# Patient Record
Sex: FEMALE | Race: WHITE | NOT HISPANIC OR LATINO | URBAN - NONMETROPOLITAN AREA
[De-identification: names, ages, dates, MRNs, and addresses within clinical notes are randomized per-mention and may not be internally consistent; named-entity substitution may affect disease eponyms.]

---

## 2019-11-20 ENCOUNTER — LAB REQUISITION (OUTPATIENT)
Dept: LAB | Facility: OTHER | Age: 34
End: 2019-11-20

## 2019-11-20 DIAGNOSIS — Z00.00 ROUTINE GENERAL MEDICAL EXAMINATION AT A HEALTH CARE FACILITY: ICD-10-CM

## 2019-11-20 PROCEDURE — UDS COCC - COLLECTION FEE ONLY: Performed by: INTERNAL MEDICINE

## 2020-12-12 PROCEDURE — U0003 INFECTIOUS AGENT DETECTION BY NUCLEIC ACID (DNA OR RNA); SEVERE ACUTE RESPIRATORY SYNDROME CORONAVIRUS 2 (SARS-COV-2) (CORONAVIRUS DISEASE [COVID-19]), AMPLIFIED PROBE TECHNIQUE, MAKING USE OF HIGH THROUGHPUT TECHNOLOGIES AS DESCRIBED BY CMS-2020-01-R: HCPCS | Performed by: NURSE PRACTITIONER

## 2021-02-28 PROCEDURE — 87635 SARS-COV-2 COVID-19 AMP PRB: CPT | Performed by: EMERGENCY MEDICINE

## 2021-04-04 ENCOUNTER — HOSPITAL ENCOUNTER (OUTPATIENT)
Facility: HOSPITAL | Age: 36
Setting detail: OBSERVATION
End: 2021-04-05
Attending: EMERGENCY MEDICINE | Admitting: INTERNAL MEDICINE

## 2021-04-04 DIAGNOSIS — R45.851 SUICIDAL IDEATION: Primary | ICD-10-CM

## 2021-04-04 DIAGNOSIS — F15.10 METHAMPHETAMINE ABUSE (HCC): ICD-10-CM

## 2021-04-04 DIAGNOSIS — N39.0 ACUTE UTI: ICD-10-CM

## 2021-04-04 PROCEDURE — 96365 THER/PROPH/DIAG IV INF INIT: CPT

## 2021-04-04 PROCEDURE — 99285 EMERGENCY DEPT VISIT HI MDM: CPT

## 2021-04-05 ENCOUNTER — APPOINTMENT (OUTPATIENT)
Dept: GENERAL RADIOLOGY | Facility: HOSPITAL | Age: 36
End: 2021-04-05

## 2021-04-05 VITALS
RESPIRATION RATE: 18 BRPM | HEART RATE: 88 BPM | SYSTOLIC BLOOD PRESSURE: 114 MMHG | HEIGHT: 69 IN | WEIGHT: 239.6 LBS | TEMPERATURE: 97.2 F | DIASTOLIC BLOOD PRESSURE: 73 MMHG | OXYGEN SATURATION: 96 % | BODY MASS INDEX: 35.49 KG/M2

## 2021-04-05 PROBLEM — R45.851 SUICIDAL IDEATIONS: Status: ACTIVE | Noted: 2021-04-05

## 2021-04-05 PROBLEM — F15.929 METHAMPHETAMINE INTOXICATION (HCC): Chronic | Status: ACTIVE | Noted: 2021-04-05

## 2021-04-05 LAB
ALBUMIN SERPL-MCNC: 4.1 G/DL (ref 3.5–5.2)
ALBUMIN/GLOB SERPL: 1.2 G/DL
ALP SERPL-CCNC: 61 U/L (ref 39–117)
ALT SERPL W P-5'-P-CCNC: 14 U/L (ref 1–33)
AMPHET+METHAMPHET UR QL: POSITIVE
AMPHETAMINES UR QL: POSITIVE
ANION GAP SERPL CALCULATED.3IONS-SCNC: 12 MMOL/L (ref 5–15)
APAP SERPL-MCNC: <5 MCG/ML (ref 0–30)
AST SERPL-CCNC: 12 U/L (ref 1–32)
BACTERIA UR QL AUTO: ABNORMAL /HPF
BARBITURATES UR QL SCN: NEGATIVE
BASOPHILS # BLD AUTO: 0.06 10*3/MM3 (ref 0–0.2)
BASOPHILS NFR BLD AUTO: 0.4 % (ref 0–1.5)
BENZODIAZ UR QL SCN: NEGATIVE
BILIRUB SERPL-MCNC: 0.4 MG/DL (ref 0–1.2)
BILIRUB UR QL STRIP: ABNORMAL
BUN SERPL-MCNC: 13 MG/DL (ref 6–20)
BUN/CREAT SERPL: 16 (ref 7–25)
BUPRENORPHINE SERPL-MCNC: NEGATIVE NG/ML
CALCIUM SPEC-SCNC: 9.2 MG/DL (ref 8.6–10.5)
CANNABINOIDS SERPL QL: NEGATIVE
CHLORIDE SERPL-SCNC: 105 MMOL/L (ref 98–107)
CLARITY UR: ABNORMAL
CO2 SERPL-SCNC: 20 MMOL/L (ref 22–29)
COCAINE UR QL: NEGATIVE
COLOR UR: ABNORMAL
CREAT SERPL-MCNC: 0.81 MG/DL (ref 0.57–1)
DEPRECATED RDW RBC AUTO: 42.1 FL (ref 37–54)
EOSINOPHIL # BLD AUTO: 0.58 10*3/MM3 (ref 0–0.4)
EOSINOPHIL NFR BLD AUTO: 3.8 % (ref 0.3–6.2)
ERYTHROCYTE [DISTWIDTH] IN BLOOD BY AUTOMATED COUNT: 13.9 % (ref 12.3–15.4)
ETHANOL BLD-MCNC: <10 MG/DL (ref 0–10)
ETHANOL UR QL: <0.01 %
FLUAV RNA RESP QL NAA+PROBE: NOT DETECTED
FLUBV RNA RESP QL NAA+PROBE: NOT DETECTED
GFR SERPL CREATININE-BSD FRML MDRD: 81 ML/MIN/1.73
GLOBULIN UR ELPH-MCNC: 3.3 GM/DL
GLUCOSE SERPL-MCNC: 109 MG/DL (ref 65–99)
GLUCOSE UR STRIP-MCNC: NEGATIVE MG/DL
HCG SERPL QL: NEGATIVE
HCT VFR BLD AUTO: 39.6 % (ref 34–46.6)
HGB BLD-MCNC: 13 G/DL (ref 12–15.9)
HGB UR QL STRIP.AUTO: NEGATIVE
HOLD SPECIMEN: NORMAL
HYALINE CASTS UR QL AUTO: ABNORMAL /LPF
IMM GRANULOCYTES # BLD AUTO: 0.05 10*3/MM3 (ref 0–0.05)
IMM GRANULOCYTES NFR BLD AUTO: 0.3 % (ref 0–0.5)
KETONES UR QL STRIP: ABNORMAL
LEUKOCYTE ESTERASE UR QL STRIP.AUTO: ABNORMAL
LITHIUM SERPL-SCNC: 0.2 MMOL/L (ref 0.6–1.2)
LYMPHOCYTES # BLD AUTO: 3.78 10*3/MM3 (ref 0.7–3.1)
LYMPHOCYTES NFR BLD AUTO: 25 % (ref 19.6–45.3)
MCH RBC QN AUTO: 27.3 PG (ref 26.6–33)
MCHC RBC AUTO-ENTMCNC: 32.8 G/DL (ref 31.5–35.7)
MCV RBC AUTO: 83 FL (ref 79–97)
METHADONE UR QL SCN: NEGATIVE
MONOCYTES # BLD AUTO: 0.8 10*3/MM3 (ref 0.1–0.9)
MONOCYTES NFR BLD AUTO: 5.3 % (ref 5–12)
NEUTROPHILS NFR BLD AUTO: 65.2 % (ref 42.7–76)
NEUTROPHILS NFR BLD AUTO: 9.87 10*3/MM3 (ref 1.7–7)
NITRITE UR QL STRIP: NEGATIVE
NRBC BLD AUTO-RTO: 0 /100 WBC (ref 0–0.2)
OPIATES UR QL: NEGATIVE
OXYCODONE UR QL SCN: NEGATIVE
PCP UR QL SCN: NEGATIVE
PH UR STRIP.AUTO: 6 [PH] (ref 5–9)
PLATELET # BLD AUTO: 281 10*3/MM3 (ref 140–450)
PMV BLD AUTO: 11.9 FL (ref 6–12)
POTASSIUM SERPL-SCNC: 3.3 MMOL/L (ref 3.5–5.2)
PROPOXYPH UR QL: NEGATIVE
PROT SERPL-MCNC: 7.4 G/DL (ref 6–8.5)
PROT UR QL STRIP: NEGATIVE
RBC # BLD AUTO: 4.77 10*6/MM3 (ref 3.77–5.28)
RBC # UR: ABNORMAL /HPF
REF LAB TEST METHOD: ABNORMAL
SALICYLATES SERPL-MCNC: <0.3 MG/DL
SARS-COV-2 RNA RESP QL NAA+PROBE: NOT DETECTED
SODIUM SERPL-SCNC: 137 MMOL/L (ref 136–145)
SP GR UR STRIP: >=1.03 (ref 1–1.03)
SQUAMOUS #/AREA URNS HPF: ABNORMAL /HPF
TRICYCLICS UR QL SCN: NEGATIVE
UROBILINOGEN UR QL STRIP: ABNORMAL
WBC # BLD AUTO: 15.14 10*3/MM3 (ref 3.4–10.8)
WBC UR QL AUTO: ABNORMAL /HPF
WHOLE BLOOD HOLD SPECIMEN: NORMAL
WHOLE BLOOD HOLD SPECIMEN: NORMAL

## 2021-04-05 PROCEDURE — 80179 DRUG ASSAY SALICYLATE: CPT | Performed by: EMERGENCY MEDICINE

## 2021-04-05 PROCEDURE — 80178 ASSAY OF LITHIUM: CPT | Performed by: EMERGENCY MEDICINE

## 2021-04-05 PROCEDURE — 80053 COMPREHEN METABOLIC PANEL: CPT | Performed by: EMERGENCY MEDICINE

## 2021-04-05 PROCEDURE — 71046 X-RAY EXAM CHEST 2 VIEWS: CPT

## 2021-04-05 PROCEDURE — 81001 URINALYSIS AUTO W/SCOPE: CPT | Performed by: EMERGENCY MEDICINE

## 2021-04-05 PROCEDURE — 80306 DRUG TEST PRSMV INSTRMNT: CPT | Performed by: EMERGENCY MEDICINE

## 2021-04-05 PROCEDURE — 87636 SARSCOV2 & INF A&B AMP PRB: CPT | Performed by: EMERGENCY MEDICINE

## 2021-04-05 PROCEDURE — G0378 HOSPITAL OBSERVATION PER HR: HCPCS

## 2021-04-05 PROCEDURE — 84703 CHORIONIC GONADOTROPIN ASSAY: CPT | Performed by: EMERGENCY MEDICINE

## 2021-04-05 PROCEDURE — 25010000002 THIAMINE PER 100 MG: Performed by: INTERNAL MEDICINE

## 2021-04-05 PROCEDURE — 85025 COMPLETE CBC W/AUTO DIFF WBC: CPT | Performed by: EMERGENCY MEDICINE

## 2021-04-05 PROCEDURE — 96365 THER/PROPH/DIAG IV INF INIT: CPT

## 2021-04-05 PROCEDURE — 80143 DRUG ASSAY ACETAMINOPHEN: CPT | Performed by: EMERGENCY MEDICINE

## 2021-04-05 PROCEDURE — C9803 HOPD COVID-19 SPEC COLLECT: HCPCS

## 2021-04-05 PROCEDURE — 82077 ASSAY SPEC XCP UR&BREATH IA: CPT | Performed by: EMERGENCY MEDICINE

## 2021-04-05 RX ORDER — POTASSIUM CHLORIDE 750 MG/1
20 CAPSULE, EXTENDED RELEASE ORAL ONCE
Status: COMPLETED | OUTPATIENT
Start: 2021-04-05 | End: 2021-04-05

## 2021-04-05 RX ORDER — LEVOFLOXACIN 500 MG/1
500 TABLET, FILM COATED ORAL EVERY 24 HOURS
Qty: 2 TABLET | Refills: 0 | Status: SHIPPED | OUTPATIENT
Start: 2021-04-05 | End: 2021-04-07

## 2021-04-05 RX ORDER — PANTOPRAZOLE SODIUM 20 MG/1
20 TABLET, DELAYED RELEASE ORAL
Status: DISCONTINUED | OUTPATIENT
Start: 2021-04-05 | End: 2021-04-05

## 2021-04-05 RX ORDER — LORAZEPAM 1 MG/1
1 TABLET ORAL ONCE
Status: COMPLETED | OUTPATIENT
Start: 2021-04-05 | End: 2021-04-05

## 2021-04-05 RX ORDER — SODIUM CHLORIDE 0.9 % (FLUSH) 0.9 %
10 SYRINGE (ML) INJECTION EVERY 12 HOURS SCHEDULED
Status: DISCONTINUED | OUTPATIENT
Start: 2021-04-05 | End: 2021-04-05 | Stop reason: HOSPADM

## 2021-04-05 RX ORDER — PANTOPRAZOLE SODIUM 40 MG/1
40 TABLET, DELAYED RELEASE ORAL
Status: DISCONTINUED | OUTPATIENT
Start: 2021-04-06 | End: 2021-04-05 | Stop reason: HOSPADM

## 2021-04-05 RX ORDER — LEVOFLOXACIN 500 MG/1
500 TABLET, FILM COATED ORAL EVERY 24 HOURS
Status: DISCONTINUED | OUTPATIENT
Start: 2021-04-05 | End: 2021-04-05 | Stop reason: HOSPADM

## 2021-04-05 RX ORDER — LORAZEPAM 0.5 MG/1
1 TABLET ORAL EVERY 6 HOURS PRN
Status: DISCONTINUED | OUTPATIENT
Start: 2021-04-05 | End: 2021-04-05 | Stop reason: HOSPADM

## 2021-04-05 RX ORDER — LEVOFLOXACIN 500 MG/1
500 TABLET, FILM COATED ORAL ONCE
Status: COMPLETED | OUTPATIENT
Start: 2021-04-05 | End: 2021-04-05

## 2021-04-05 RX ORDER — SODIUM CHLORIDE 0.9 % (FLUSH) 0.9 %
10 SYRINGE (ML) INJECTION AS NEEDED
Status: DISCONTINUED | OUTPATIENT
Start: 2021-04-05 | End: 2021-04-05 | Stop reason: HOSPADM

## 2021-04-05 RX ORDER — NICOTINE 21 MG/24HR
1 PATCH, TRANSDERMAL 24 HOURS TRANSDERMAL
Status: DISCONTINUED | OUTPATIENT
Start: 2021-04-05 | End: 2021-04-05 | Stop reason: HOSPADM

## 2021-04-05 RX ORDER — LITHIUM CARBONATE 150 MG/1
600 CAPSULE ORAL 2 TIMES DAILY
Status: DISCONTINUED | OUTPATIENT
Start: 2021-04-05 | End: 2021-04-05 | Stop reason: HOSPADM

## 2021-04-05 RX ADMIN — LEVOFLOXACIN 500 MG: 500 TABLET, FILM COATED ORAL at 01:38

## 2021-04-05 RX ADMIN — LITHIUM CARBONATE 600 MG: 150 CAPSULE, GELATIN COATED ORAL at 09:03

## 2021-04-05 RX ADMIN — Medication 10 ML: at 09:16

## 2021-04-05 RX ADMIN — LORAZEPAM 1 MG: 1 TABLET ORAL at 04:15

## 2021-04-05 RX ADMIN — NICOTINE 1 PATCH: 21 PATCH, EXTENDED RELEASE TRANSDERMAL at 12:41

## 2021-04-05 RX ADMIN — PANTOPRAZOLE SODIUM 20 MG: 20 TABLET, DELAYED RELEASE ORAL at 09:03

## 2021-04-05 RX ADMIN — LORAZEPAM 1 MG: 0.5 TABLET ORAL at 12:36

## 2021-04-05 RX ADMIN — POTASSIUM CHLORIDE 20 MEQ: 750 CAPSULE, EXTENDED RELEASE ORAL at 01:38

## 2021-04-05 RX ADMIN — FOLIC ACID 1000 ML/HR: 5 INJECTION, SOLUTION INTRAMUSCULAR; INTRAVENOUS; SUBCUTANEOUS at 10:07

## 2021-04-05 NOTE — H&P
History and Physical  Nish Montoya MD  Hospitalist    Date of admission: 4/5/2021    Patient Care Team:  Jessi Sharp APRN as PCP - General (Family Medicine)    Chief complaint   Chief Complaint   Patient presents with   • Suicidal ideas / methamphetamine intoxication     Subjective     Patient is a 35 y.o. female admitted for suicidal ideas apparently prompted by stressors in her life. She is placed under a 72 hour hold pending her transfer to [facility], transfer postponed for now by the admitting psychiatrist given her methamphetamine use.    History  Past Medical History:   Diagnosis Date   • Anxiety    • Bipolar disorder (CMS/HCC)    • GERD (gastroesophageal reflux disease)      History reviewed. No pertinent surgical history.  Family History   Problem Relation Age of Onset   • Hypertension Father      Social History     Tobacco Use   • Smoking status: Current Every Day Smoker     Packs/day: 1.00     Years: 20.00     Pack years: 20.00   • Smokeless tobacco: Never Used   Vaping Use   • Vaping Use: Some days   • Substances: Nicotine   • Devices: LensAR tank   Substance Use Topics   • Alcohol use: Never   • Drug use: Yes     Types: Methamphetamines     Medications Prior to Admission   Medication Sig Dispense Refill Last Dose   • enalapril (VASOTEC) 10 MG tablet Take 10 mg by mouth Daily.      • ibuprofen (ADVIL,MOTRIN) 800 MG tablet Take 800 mg by mouth Every 6 (Six) Hours As Needed for Mild Pain .      • lithium 600 MG capsule Take 600 mg by mouth 2 (Two) Times a Day.      • MELATONIN GUMMIES PO Take  by mouth As Needed.      • pantoprazole (PROTONIX) 20 MG EC tablet Take 20 mg by mouth Daily.        Allergies:  Rocephin [ceftriaxone]    Review of Systems  Review of Systems   Constitutional: Positive for fatigue. Negative for fever.   Respiratory: Negative for cough, choking and wheezing.    Cardiovascular: Negative for chest pain, palpitations and leg swelling.   Gastrointestinal: Negative  for abdominal distention, blood in stool, nausea and vomiting.   Genitourinary: Positive for frequency. Negative for dysuria, enuresis and urgency.   Musculoskeletal: Negative for arthralgias, back pain and joint swelling.   Skin: Negative for color change, pallor and rash.   Neurological: Positive for weakness and headaches. Negative for speech difficulty, light-headedness and numbness.   Psychiatric/Behavioral: Positive for suicidal ideas. Negative for agitation, behavioral problems and confusion.   All other systems reviewed and are negative.      Objective     Vital Signs  Temp:  [97.7 °F (36.5 °C)-98.6 °F (37 °C)] 97.7 °F (36.5 °C)  Heart Rate:  [] 107  Resp:  [16-22] 18  BP: (104-165)/() 123/82    Physical Exam:  Physical Exam  Vitals reviewed.   Constitutional:       General: She is not in acute distress.     Appearance: She is obese. She is not ill-appearing.   HENT:      Head: Normocephalic and atraumatic.   Eyes:      General: No scleral icterus.     Extraocular Movements: Extraocular movements intact.      Pupils: Pupils are equal, round, and reactive to light.   Cardiovascular:      Rate and Rhythm: Normal rate and regular rhythm.   Pulmonary:      Effort: No respiratory distress.      Breath sounds: No stridor. No wheezing, rhonchi or rales.   Abdominal:      General: Bowel sounds are normal. There is no distension.      Palpations: Abdomen is soft. There is no mass.      Tenderness: There is no abdominal tenderness. There is no right CVA tenderness, left CVA tenderness or guarding.   Musculoskeletal:         General: No swelling, tenderness or deformity. Normal range of motion.      Cervical back: Normal range of motion and neck supple. No rigidity or tenderness.      Right lower leg: No edema.      Left lower leg: No edema.   Skin:     General: Skin is warm and dry.      Coloration: Skin is not pale.   Neurological:      General: No focal deficit present.      Mental Status: She is alert  and oriented to person, place, and time.      Cranial Nerves: No cranial nerve deficit.      Sensory: No sensory deficit.      Gait: Gait normal.      Deep Tendon Reflexes: Reflexes normal.         Results Review:   Lab Results (last 24 hours)     Procedure Component Value Units Date/Time    hCG, Serum, Qualitative [[id number]]  (Normal) Collected: 04/05/21 0012    Specimen: Blood Updated: 04/05/21 0158     HCG Qualitative Negative    COVID-19 and FLU A/B PCR - Swab, Nasopharynx [[id number]]  (Normal) Collected: 04/05/21 0011    Specimen: Swab from Nasopharynx Updated: 04/05/21 0120     COVID19 Not Detected     Influenza A PCR Not Detected     Influenza B PCR Not Detected    Narrative:      Fact sheet for providers: https://www.fda.gov/media/167568/download    Fact sheet for patients: https://www.fda.gov/media/497835/download    Test performed by PCR.    [place] Draw [[id number]] Collected: 04/05/21 0012    Specimen: Blood Updated: 04/05/21 0115    Narrative:      The following orders were created for panel order [place] Draw.  Procedure                               Abnormality         Status                     ---------                               -----------         ------                     Light Blue Top[[id number]]                                   Final result               Green Top (Gel)[[id number]]                                  Final result               Lavender Top[[id number]]                                     Final result               Gold Top - SST[[id number]]                                                                Please view results for these tests on the individual orders.    Light Blue Top [[id number]] Collected: 04/05/21 0012    Specimen: Blood Updated: 04/05/21 0115     Extra Tube hold for add-on     Comment: Auto resulted       Lavender Top [[id number]] Collected: 04/05/21 0012    Specimen: Blood Updated: 04/05/21 0115     Extra Tube hold for add-on     Comment: Auto resulted       Green  Top (Gel) [[id number]] Collected: 04/05/21 0012    Specimen: Blood Updated: 04/05/21 0115     Extra Tube Hold for add-ons.     Comment: Auto resulted.       Lithium Level [[id number]]  (Abnormal) Collected: 04/05/21 0012    Specimen: Blood Updated: 04/05/21 0036     Lithium 0.2 mmol/L     Comprehensive Metabolic Panel [[id number]]  (Abnormal) Collected: 04/05/21 0012    Specimen: Blood Updated: 04/05/21 0034     Glucose 109 mg/dL      BUN 13 mg/dL      Creatinine 0.81 mg/dL      Sodium 137 mmol/L      Potassium 3.3 mmol/L      Chloride 105 mmol/L      CO2 20.0 mmol/L      Calcium 9.2 mg/dL      Total Protein 7.4 g/dL      Albumin 4.10 g/dL      ALT (SGPT) 14 U/L      AST (SGOT) 12 U/L      Alkaline Phosphatase 61 U/L      Total Bilirubin 0.4 mg/dL      eGFR Non African Amer 81 mL/min/1.73      Globulin 3.3 gm/dL      A/G Ratio 1.2 g/dL      BUN/Creatinine Ratio 16.0     Anion Gap 12.0 mmol/L     Narrative:      GFR Normal >60  Chronic Kidney Disease <60  Kidney Failure <15      Acetaminophen Level [[id number]]  (Normal) Collected: 04/05/21 0012    Specimen: Blood Updated: 04/05/21 0034     Acetaminophen <5.0 mcg/mL     Ethanol [[id number]] Collected: 04/05/21 0012    Specimen: Blood Updated: 04/05/21 0034     Ethanol <10 mg/dL      Ethanol % <0.010 %     Salicylate Level [[id number]]  (Normal) Collected: 04/05/21 0012    Specimen: Blood Updated: 04/05/21 0034     Salicylate <0.3 mg/dL     Urine Drug Screen - Urine, Clean Catch [[id number]]  (Abnormal) Collected: 04/05/21 0011    Specimen: Urine, Clean Catch Updated: 04/05/21 0025     THC, Screen, Urine Negative     Phencyclidine (PCP), Urine Negative     Cocaine Screen, Urine Negative     Methamphetamine, Ur Positive     Opiate Screen Negative     Amphetamine Screen, Urine Positive     Benzodiazepine Screen, Urine Negative     Tricyclic Antidepressants Screen Negative     Methadone Screen, Urine Negative     Barbiturates Screen, Urine Negative     Oxycodone Screen, Urine  Negative     Propoxyphene Screen Negative     Buprenorphine, Screen, Urine Negative    Narrative:      Cutoff For Drugs Screened:    Amphetamines               500 ng/ml  Barbiturates               200 ng/ml  Benzodiazepines            150 ng/ml  Cocaine                    150 ng/ml  Methadone                  200 ng/ml  Opiates                    100 ng/ml  Phencyclidine               25 ng/ml  THC                            50 ng/ml  Methamphetamine            500 ng/ml  Tricyclic Antidepressants  300 ng/ml  Oxycodone                  100 ng/ml  Propoxyphene               300 ng/ml  Buprenorphine               10 ng/ml    The normal value for all drugs tested is negative. This report includes unconfirmed screening results, with the cutoff values listed, to be used for medical treatment purposes only.  Unconfirmed results must not be used for non-medical purposes such as employment or legal testing.  Clinical consideration should be applied to any drug of abuse test, particularly when unconfirmed results are used.      Urinalysis, Microscopic Only - Urine, Clean Catch [[id number]]  (Abnormal) Collected: 04/05/21 0012    Specimen: Urine, Clean Catch Updated: 04/05/21 0021     RBC, UA 0-2 /HPF      WBC, UA Too Numerous to Count /HPF      Bacteria, UA None Seen /HPF      Squamous Epithelial Cells, UA 6-12 /HPF      Hyaline Casts, UA 31-50 /LPF      Methodology Manual Light Microscopy    Urinalysis With Microscopic If Indicated (No Culture) - Urine, Clean Catch [[id number]]  (Abnormal) Collected: 04/05/21 0012    Specimen: Urine, Clean Catch Updated: 04/05/21 0021     Color, UA Dark Yellow     Appearance, UA Cloudy     pH, UA 6.0     Specific Gravity, UA >=1.030     Glucose, UA Negative     Ketones, UA Trace     Bilirubin, UA Small (1+)     Blood, UA Negative     Protein, UA Negative     Leuk Esterase, UA Moderate (2+)     Nitrite, UA Negative     Urobilinogen, UA 0.2 E.U./dL    CBC & Differential [[id number]]  (Abnormal)  Collected: 04/05/21 0012    Specimen: Blood Updated: 04/05/21 0014    Narrative:      The following orders were created for panel order CBC & Differential.  Procedure                               Abnormality         Status                     ---------                               -----------         ------                     CBC Auto Differential[[id number]]        Abnormal            Final result                 Please view results for these tests on the individual orders.    CBC Auto Differential [[id number]]  (Abnormal) Collected: 04/05/21 0012    Specimen: Blood Updated: 04/05/21 0014     WBC 15.14 10*3/mm3      RBC 4.77 10*6/mm3      Hemoglobin 13.0 g/dL      Hematocrit 39.6 %      MCV 83.0 fL      MCH 27.3 pg      MCHC 32.8 g/dL      RDW 13.9 %      RDW-SD 42.1 fl      MPV 11.9 fL      Platelets 281 10*3/mm3      Neutrophil % 65.2 %      Lymphocyte % 25.0 %      Monocyte % 5.3 %      Eosinophil % 3.8 %      Basophil % 0.4 %      Immature Grans % 0.3 %      Neutrophils, Absolute 9.87 10*3/mm3      Lymphocytes, Absolute 3.78 10*3/mm3      Monocytes, Absolute 0.80 10*3/mm3      Eosinophils, Absolute 0.58 10*3/mm3      Basophils, Absolute 0.06 10*3/mm3      Immature Grans, Absolute 0.05 10*3/mm3      nRBC 0.0 /100 WBC           Imaging Results (Last 24 Hours)     Procedure Component Value Units Date/Time    XR Chest 2 View [[id number]] Collected: 04/05/21 0025     Updated: 04/05/21 0043    Narrative:      PA AND LATERAL CHEST X-RAY    CLINICAL HISTORY: cough    COMPARISON: None.    FINDINGS: 2 views (PA and lateral) of the chest were obtained.  The lungs are well expanded and appear clear. Heart size and  pulmonary vascularity are normal. No pleural effusions.        Impression:      1. No active disease.    Electronically signed by:  Michael Huang MD  4/5/2021 12:42 AM  CDT Workstation: [id number]          Assessment/Plan       Methamphetamine intoxication (CMS/HCC)    Suicidal ideations    Bipolar disorder  (CMS/[facility])    Acute UTI (urinary tract infection)    Continue to monitor under 1:1 observation, provide her with PRN Ativan, oral antibiotics for the mild UTI, supportive care and wait for her to be accepted at [facility] once her observation period expires.    Nish Montoya MD  04/05/21  12:02 CDT

## 2021-04-05 NOTE — ED NOTES
Sitter at bedside. This RN ordered patient a Safe Tray for breakfast.      Samina Alvarado, RN  04/05/21 2850

## 2021-04-05 NOTE — PROGRESS NOTES
04/05/21 1337  --  --  --  --  room air  --   04/05/21 1154  97.7 (36.5)  107  18  123/82  room air  97   04/05/21 1126  98.5 (36.9)  96  18  104/57  room air  98   04/05/21 10:07:36  98.5 (36.9)  82  18  112/67  room air  95   04/05/21 0907  --  104  18  109/55  --  --   04/05/21 0751  --  87  18  125/78  room air  98   04/05/21 0352  --  98  16  112/62  room air  96   04/05/21 0234  --  99  16  106/73  room air  96   04/05/21 01:38:42  --  96  16  134/86  room air  100   04/05/21 00:01:13  --  118  --  134/93  room air  100   04/04/21 2337  98.6 (37)  120  22  165/123Abnormal   room air  100

## 2021-04-05 NOTE — ED NOTES
"Pt presents to ED for psych evaluation. Pt states she had been sober for 16 month and had a \"slip\" and used meth. Pt states her \"slip\" was the result of a false positive drug screen in drug court. Pt states since this has occurred, she has been anxious, depressed, and having thoughts of killing herself. Pt is afraid she will go to correction because of being in drug court. Pt has 2 months until graduation from program. Pt states she has a good job and has built her home for her and her child and this is going to ruin everything. Pt states she just does not see a way out. Pt anxious but cooperative at this time.  Sitter placed at bedside and pt placed in a yellow gown.     Layne Mathew, RN  04/05/21 0336       Layne Mathew, RN  04/05/21 0331    "

## 2021-04-05 NOTE — ED NOTES
Lunch tray ordered for patient.      Samina Alvarado, RN  04/05/21 1116    Sitter at the bedside.      Samina Alvarado RN  04/05/21 1117

## 2021-04-05 NOTE — ED NOTES
Sitter at bedside. Pt denies any needs at present.      Layne Mathew, RN  04/05/21 0340       Layne Mathew, RN  04/05/21 0348

## 2021-04-05 NOTE — ED NOTES
710 and Personal Identifier form completed, faxed to [facility] respond and placed on patient chart.      Vanessa Marion RN  04/05/21 6149

## 2021-04-05 NOTE — ED NOTES
Pt having an evaluation with Rebecca with [facility] at this time.  Sitter at bedside.  No further needs noted.      Layne Mathew, RN  04/05/21 3289

## 2021-04-05 NOTE — ED NOTES
Provider at [facility] reports they will not accept patient until 1800 tonight because of Meth in patient's system. Rebecca gayle [facility] made aware. Pt changed to medical admit due to this.      Vanessa Marion RN  04/05/21 0533

## 2021-04-05 NOTE — ED NOTES
Provider paged as patient is requesting a banana bag to help her feel better.      Samina Alvarado RN  04/05/21 0947

## 2021-04-05 NOTE — ED PROVIDER NOTES
"Subjective   35-year-old white female with history of bipolar disorder presents to the emergency department with chief complaint of suicidal ideation.  Patient states \"I've got a lot of crazy shit happening in my life.\"  Patient expresses suicidal ideation with plan to overdose.          Review of Systems   Constitutional: Negative for chills, diaphoresis and fever.   Respiratory: Positive for cough. Negative for shortness of breath.    Cardiovascular: Negative for chest pain.   Gastrointestinal: Positive for diarrhea. Negative for abdominal pain, nausea and vomiting.   Genitourinary: Negative for dysuria.   Musculoskeletal: Negative for back pain, gait problem and neck pain.   Neurological: Positive for headaches. Negative for seizures, syncope and weakness.   Psychiatric/Behavioral: Positive for suicidal ideas. The patient is nervous/anxious.    All other systems reviewed and are negative.      Past Medical History:   Diagnosis Date   • Anxiety    • Bipolar disorder (CMS/[facility])    • GERD (gastroesophageal reflux disease)        Allergies   Allergen Reactions   • Rocephin [Ceftriaxone] Other (See Comments)     Syncope       History reviewed. No pertinent surgical history.    Family History   Problem Relation Age of Onset   • Hypertension Father        Social History     Socioeconomic History   • Marital status: Single     Spouse name: Not on file   • Number of children: Not on file   • Years of education: Not on file   • Highest education level: Not on file   Tobacco Use   • Smoking status: Current Every Day Smoker     Packs/day: 1.00     Years: 20.00     Pack years: 20.00   • Smokeless tobacco: Never Used   Vaping Use   • Vaping Use: Some days   • Substances: Nicotine   • Devices: Refillable tank   Substance and Sexual Activity   • Alcohol use: Never   • Drug use: Yes     Types: Methamphetamines   • Sexual activity: Not Currently           Objective   Physical Exam  Vitals and nursing note reviewed. "   Constitutional:       General: She is not in acute distress.     Appearance: She is not toxic-appearing or diaphoretic.   HENT:      Head: Normocephalic and atraumatic.      Right Ear: External ear normal.      Left Ear: External ear normal.      Nose: Nose normal.      Mouth/Throat:      Mouth: Mucous membranes are moist.      Pharynx: Oropharynx is clear.   Eyes:      Extraocular Movements: Extraocular movements intact.      Conjunctiva/sclera: Conjunctivae normal.      Pupils: Pupils are equal, round, and reactive to light.   Cardiovascular:      Rate and Rhythm: Regular rhythm. Tachycardia present.      Pulses: Normal pulses.      Heart sounds: Normal heart sounds.   Pulmonary:      Effort: Pulmonary effort is normal.      Breath sounds: Normal breath sounds.   Abdominal:      General: Bowel sounds are normal. There is no distension.      Palpations: Abdomen is soft. There is no mass.      Tenderness: There is no abdominal tenderness. There is no guarding.   Musculoskeletal:      Cervical back: Normal range of motion and neck supple.      Right lower leg: No edema.      Left lower leg: No edema.   Skin:     General: Skin is warm and dry.   Neurological:      General: No focal deficit present.      Mental Status: She is alert and oriented to person, place, and time.   Psychiatric:         Mood and Affect: Mood is anxious.         Thought Content: Thought content includes suicidal ideation. Thought content includes suicidal plan.         Procedures           ED Course  ED Course as of Apr 05 0511 Mon Apr 05, 2021   0408 Rebecca from [facility] evaluated the patient and she recommends admission to [facility].  She will arrange for the  to complete court ordered hospitalization in the morning.    [DR]   0508 Case discussed with the psychiatrist on-call at [facility] Dr. Victor.  He related to me that he was unable to accept the patient until after 6 PM due to her methamphetamine abuse.  Therefore I  will page the hospitalist to admit her for observation.    []   0509 Case discussed with Dr. Montoya.  He agrees to admit the patient to the medical floor.    []      ED Course User Index  [] Paulino Saha MD           Labs Reviewed   COMPREHENSIVE METABOLIC PANEL - Abnormal; Notable for the following components:       Result Value    Glucose 109 (*)     Potassium 3.3 (*)     CO2 20.0 (*)     All other components within normal limits    Narrative:     GFR Normal >60  Chronic Kidney Disease <60  Kidney Failure <15     URINE DRUG SCREEN - Abnormal; Notable for the following components:    Methamphetamine, Ur Positive (*)     Amphetamine Screen, Urine Positive (*)     All other components within normal limits    Narrative:     Cutoff For Drugs Screened:    Amphetamines               500 ng/ml  Barbiturates               200 ng/ml  Benzodiazepines            150 ng/ml  Cocaine                    150 ng/ml  Methadone                  200 ng/ml  Opiates                    100 ng/ml  Phencyclidine               25 ng/ml  THC                            50 ng/ml  Methamphetamine            500 ng/ml  Tricyclic Antidepressants  300 ng/ml  Oxycodone                  100 ng/ml  Propoxyphene               300 ng/ml  Buprenorphine               10 ng/ml    The normal value for all drugs tested is negative. This report includes unconfirmed screening results, with the cutoff values listed, to be used for medical treatment purposes only.  Unconfirmed results must not be used for non-medical purposes such as employment or legal testing.  Clinical consideration should be applied to any drug of abuse test, particularly when unconfirmed results are used.     URINALYSIS W/ MICROSCOPIC IF INDICATED (NO CULTURE) - Abnormal; Notable for the following components:    Appearance, UA Cloudy (*)     Ketones, UA Trace (*)     Bilirubin, UA Small (1+) (*)     Leuk Esterase, UA Moderate (2+) (*)     All other components within normal limits    CBC WITH AUTO DIFFERENTIAL - Abnormal; Notable for the following components:    WBC 15.14 (*)     Neutrophils, Absolute 9.87 (*)     Lymphocytes, Absolute 3.78 (*)     Eosinophils, Absolute 0.58 (*)     All other components within normal limits   LITHIUM LEVEL - Abnormal; Notable for the following components:    Lithium 0.2 (*)     All other components within normal limits   URINALYSIS, MICROSCOPIC ONLY - Abnormal; Notable for the following components:    RBC, UA 0-2 (*)     WBC, UA Too Numerous to Count (*)     Squamous Epithelial Cells, UA 6-12 (*)     All other components within normal limits   COVID-19 AND FLU A/B, NP SWAB IN TRANSPORT MEDIA 8-12 HR TAT - Normal    Narrative:     Fact sheet for providers: https://www.fda.gov/media/636034/download    Fact sheet for patients: https://www.fda.gov/media/679759/download    Test performed by PCR.   ACETAMINOPHEN LEVEL - Normal   SALICYLATE LEVEL - Normal   HCG, SERUM, QUALITATIVE - Normal   RAINBOW DRAW    Narrative:     The following orders were created for panel order [place] Draw.  Procedure                               Abnormality         Status                     ---------                               -----------         ------                     Light Blue Top[[id number]]                                   Final result               Green Top (Gel)[[id number]]                                  Final result               Lavender Top[[id number]]                                     Final result               Gold Top - SST[[id number]]                                                                Please view results for these tests on the individual orders.   ETHANOL   CBC AND DIFFERENTIAL    Narrative:     The following orders were created for panel order CBC & Differential.  Procedure                               Abnormality         Status                     ---------                               -----------         ------                     CBC Auto  Differential[[id number]]        Abnormal            Final result                 Please view results for these tests on the individual orders.   LIGHT BLUE TOP   GREEN TOP   LAVENDER TOP     XR Chest 2 View    Result Date: 4/5/2021  Narrative: PA AND LATERAL CHEST X-RAY CLINICAL HISTORY: cough COMPARISON: None. FINDINGS: 2 views (PA and lateral) of the chest were obtained. The lungs are well expanded and appear clear. Heart size and pulmonary vascularity are normal. No pleural effusions.     Impression: 1. No active disease. Electronically signed by:  Michael Huang MD  4/5/2021 12:42 AM CDT Workstation: [id number]                                  [facility]    Final diagnoses:   Suicidal ideation   Methamphetamine abuse (CMS/HCC)   Acute UTI       ED Disposition  ED Disposition     ED Disposition Condition Comment    Decision to Admit  Level of Care: Med/Surg [1]   Diagnosis: Suicidal ideation [V62.84.ICD-9-CM]   Admitting Physician: THERESE REDD [[id number]]   Attending Physician: THERESE REDD [[id number]]            No follow-up provider specified.       Medication List      No changes were made to your prescriptions during this visit.          Paulino Saha MD  04/05/21 0543

## 2021-04-05 NOTE — ED NOTES
This RN attempted IV insertion x2, unsuccessful. Charge RN notified.      Samina Alvarado, RN  04/05/21 0973

## 2021-04-05 NOTE — ED NOTES
[facility] packet taken to pt at this time. Pt refusing to be evaluated by [facility]. Explained to pt that our behavioral health unit was at a max capacity and they were not evaluating any patients for admission. Pt states she cannot be evaluated by [facility] because she does not want to be admitted to [facility]. Pt also concerned with record sharing with [facility] and the court system. MD notified and 72 hour hold placed on pt at this time. MD in to speak with pt regarding her high risk situation and the need for a mental health evaluation.  Pt agitated, but cooperative and filled out paperwork.       Layne Mathew, RN  04/05/21 1255

## 2021-04-06 NOTE — NURSING NOTE
[facility]'s Dept here to  patient for transfer to [facility]. Security called and patients belongings returned to patient. Paperwork faxed to [facility].

## 2021-04-07 NOTE — DISCHARGE SUMMARY
Discharge Summary  Nish Montoya MD  Hospitalist     Date of Discharge:  2021    Discharge Diagnosis:      Methamphetamine intoxication (CMS/[facility])    Suicidal ideations    Bipolar disorder (CMS/[facility])    Acute UTI (urinary tract infection)      Presenting Problem/History of Present Illness  Suicidal ideation    Hospital Course  Patient is a 35 y.o. female admitted for suicidal ideation precipitated by methamphetamine intoxication.  Her overall condition remained medically stable and once the 24-hour observation period had  she was transferred to [facility] in [facility] under a 72-hour hold.    She was diagnosed with a mild UTI on admission, infection for which she continue with the oral antibiotic.    Consults:   Consults     No orders found from 3/6/2021 to 2021.        Pertinent Test Results: UDS positive for methamphetamine    Condition on Discharge: stable for transfer    Vital Signs: /57 to 125/78, RR 18, HR 80 to 107, Temp 97.8, O2 saturation 96% on room air       Physical Exam:  Physical Exam    Discharge Disposition  [facility] ([facility])    Discharge Medications     Discharge Medications      New Medications      Instructions Start Date   levoFLOXacin 500 MG tablet  Commonly known as: LEVAQUIN   500 mg, Oral, Every 24 Hours         Continue These Medications      Instructions Start Date   enalapril 10 MG tablet  Commonly known as: VASOTEC   10 mg, Oral, Daily      ibuprofen 800 MG tablet  Commonly known as: ADVIL,MOTRIN   800 mg, Oral, Every 6 Hours PRN      lithium 600 MG capsule   600 mg, Oral, 2 Times Daily      MELATONIN GUMMIES PO   Oral, As Needed      pantoprazole 20 MG EC tablet  Commonly known as: PROTONIX   20 mg, Oral, Daily             Discharge Diet:   Diet Instructions     Advance Diet As Tolerated            Activity at Discharge:   Activity Instructions     Activity as Tolerated            Follow-up Appointments  No  future appointments.  Additional Instructions for the Follow-ups that You Need to Schedule     Discharge Follow-up with PCP   As directed       Currently Documented PCP:    Jessi Sharp APRN    PCP Phone Number:    [phone]     Follow Up Details: 1-2 weeks                Nish Montoya MD  04/06/21  19:16 CDT

## 2021-10-17 PROCEDURE — 87635 SARS-COV-2 COVID-19 AMP PRB: CPT | Performed by: NURSE PRACTITIONER

## 2021-10-18 ENCOUNTER — LAB (OUTPATIENT)
Dept: LAB | Facility: OTHER | Age: 36
End: 2021-10-18